# Patient Record
Sex: FEMALE | Race: WHITE | NOT HISPANIC OR LATINO
[De-identification: names, ages, dates, MRNs, and addresses within clinical notes are randomized per-mention and may not be internally consistent; named-entity substitution may affect disease eponyms.]

---

## 2017-09-10 ENCOUNTER — TRANSCRIPTION ENCOUNTER (OUTPATIENT)
Age: 32
End: 2017-09-10

## 2017-10-25 ENCOUNTER — OUTPATIENT (OUTPATIENT)
Dept: OUTPATIENT SERVICES | Facility: HOSPITAL | Age: 32
LOS: 1 days | Discharge: HOME | End: 2017-10-25

## 2017-10-25 DIAGNOSIS — M54.9 DORSALGIA, UNSPECIFIED: ICD-10-CM

## 2018-01-31 ENCOUNTER — OUTPATIENT (OUTPATIENT)
Dept: OUTPATIENT SERVICES | Facility: HOSPITAL | Age: 33
LOS: 1 days | Discharge: HOME | End: 2018-01-31

## 2018-01-31 DIAGNOSIS — M54.16 RADICULOPATHY, LUMBAR REGION: ICD-10-CM

## 2018-01-31 DIAGNOSIS — Z01.818 ENCOUNTER FOR OTHER PREPROCEDURAL EXAMINATION: ICD-10-CM

## 2018-02-07 ENCOUNTER — RESULT REVIEW (OUTPATIENT)
Age: 33
End: 2018-02-07

## 2018-02-07 ENCOUNTER — OUTPATIENT (OUTPATIENT)
Dept: OUTPATIENT SERVICES | Facility: HOSPITAL | Age: 33
LOS: 1 days | Discharge: HOME | End: 2018-02-07

## 2018-02-07 VITALS — RESPIRATION RATE: 18 BRPM | HEART RATE: 70 BPM | SYSTOLIC BLOOD PRESSURE: 133 MMHG | DIASTOLIC BLOOD PRESSURE: 86 MMHG

## 2018-02-07 VITALS
HEART RATE: 94 BPM | RESPIRATION RATE: 20 BRPM | SYSTOLIC BLOOD PRESSURE: 142 MMHG | HEIGHT: 69 IN | WEIGHT: 190.04 LBS | DIASTOLIC BLOOD PRESSURE: 91 MMHG | TEMPERATURE: 98 F

## 2018-02-07 LAB — ABO RH CONFIRMATION: SIGNIFICANT CHANGE UP

## 2018-02-07 RX ORDER — SODIUM CHLORIDE 9 MG/ML
1000 INJECTION, SOLUTION INTRAVENOUS
Qty: 0 | Refills: 0 | Status: DISCONTINUED | OUTPATIENT
Start: 2018-02-07 | End: 2018-02-08

## 2018-02-07 RX ORDER — HYDROMORPHONE HYDROCHLORIDE 2 MG/ML
0.5 INJECTION INTRAMUSCULAR; INTRAVENOUS; SUBCUTANEOUS
Qty: 0 | Refills: 0 | Status: DISCONTINUED | OUTPATIENT
Start: 2018-02-07 | End: 2018-02-08

## 2018-02-07 RX ORDER — ONDANSETRON 8 MG/1
4 TABLET, FILM COATED ORAL ONCE
Qty: 0 | Refills: 0 | Status: DISCONTINUED | OUTPATIENT
Start: 2018-02-07 | End: 2018-02-08

## 2018-02-07 RX ORDER — OXYCODONE AND ACETAMINOPHEN 5; 325 MG/1; MG/1
1 TABLET ORAL ONCE
Qty: 0 | Refills: 0 | Status: DISCONTINUED | OUTPATIENT
Start: 2018-02-07 | End: 2018-02-08

## 2018-02-07 RX ADMIN — SODIUM CHLORIDE 100 MILLILITER(S): 9 INJECTION, SOLUTION INTRAVENOUS at 16:15

## 2018-02-07 RX ADMIN — HYDROMORPHONE HYDROCHLORIDE 0.5 MILLIGRAM(S): 2 INJECTION INTRAMUSCULAR; INTRAVENOUS; SUBCUTANEOUS at 14:51

## 2018-02-07 RX ADMIN — HYDROMORPHONE HYDROCHLORIDE 0.5 MILLIGRAM(S): 2 INJECTION INTRAMUSCULAR; INTRAVENOUS; SUBCUTANEOUS at 14:27

## 2018-02-07 RX ADMIN — HYDROMORPHONE HYDROCHLORIDE 0.5 MILLIGRAM(S): 2 INJECTION INTRAMUSCULAR; INTRAVENOUS; SUBCUTANEOUS at 15:09

## 2018-02-07 RX ADMIN — HYDROMORPHONE HYDROCHLORIDE 0.5 MILLIGRAM(S): 2 INJECTION INTRAMUSCULAR; INTRAVENOUS; SUBCUTANEOUS at 14:43

## 2018-02-07 RX ADMIN — SODIUM CHLORIDE 100 MILLILITER(S): 9 INJECTION, SOLUTION INTRAVENOUS at 14:05

## 2018-02-07 NOTE — CHART NOTE - NSCHARTNOTEFT_GEN_A_CORE
02-07-18 @ 10:35  ARNOLD VELA  [id number]  32yFemale    I have discussed the risks and benefits of hemilaminectomy/discectomy with the patient/patient's proxy including but not limited to bleeding, infection, weakness, numbness, paralysis, CSF leak requiring repair, no change or increase in pain or other symptoms, change in bowel/bladder/sexual function, need for decompression, revision, repeat or other procedure(s).  I have also discussed the possibility of the following:    Failure of fusion (pseudoarthrosis) or over-fusion requiring revision  Use of allografts/autografts/biologics/hardware  Dysphonia, dysphagia, great vessel or esophageal damage, or anterior hematoma requiring decompression  Removal or replacement of device(s)  Lack of coverage compared to trial or abdominal pain/paresthesias  Adjacent segment progression requiring treatment(s)  Extended hospital/rehab/skilled nursing facility stay  Need for flat bedrest  Use of bracing/collar use for an extended period of time    I have also discussed the alternatives to the procedure as well as options for no treatment and/or expected courses for all.  I also discussed the role of any MD/PA first assistant and the patient/patient's proxy assents to their participation.  All questions were answered and the patient/patient's proxy wishes to proceed.

## 2018-02-07 NOTE — ASU DISCHARGE PLAN (ADULT/PEDIATRIC). - MEDICATION SUMMARY - MEDICATIONS TO TAKE
I will START or STAY ON the medications listed below when I get home from the hospital:    oxyCODONE-acetaminophen 5 mg-325 mg oral tablet  -- 1 tab(s) by mouth 4 times a day, As Needed -for moderate pain MDD:4 tabs   -- Caution federal law prohibits the transfer of this drug to any person other  than the person for whom it was prescribed.  May cause drowsiness.  Alcohol may intensify this effect.  Use care when operating dangerous machinery.  This prescription cannot be refilled.  This product contains acetaminophen.  Do not use  with any other product containing acetaminophen to prevent possible liver damage.  Using more of this medication than prescribed may cause serious breathing problems.    -- Indication: For M51.16 / M54.16 / 73600    gabapentin 300 mg oral capsule  -- 1 tab(s) by mouth 2 times a day  -- Indication: For M51.16 / M54.16 / 32667    tiZANidine 4 mg oral tablet  -- 1 tab(s) by mouth every 8 hours, As Needed muscle spasms  -- Indication: For M51.16 / M54.16 / 65801    pantoprazole 20 mg oral delayed release tablet  -- 1 tab(s) by mouth once a day  -- Indication: For M51.16 / M54.16 / 04495

## 2018-02-07 NOTE — BRIEF OPERATIVE NOTE - PROCEDURE
<<-----Click on this checkbox to enter Procedure Hemilaminectomy with discectomy  02/07/2018  Left L5/S1  Active  [id number]

## 2018-02-09 LAB — SURGICAL PATHOLOGY STUDY: SIGNIFICANT CHANGE UP

## 2018-02-12 DIAGNOSIS — M51.17 INTERVERTEBRAL DISC DISORDERS WITH RADICULOPATHY, LUMBOSACRAL REGION: ICD-10-CM

## 2018-10-03 ENCOUNTER — TRANSCRIPTION ENCOUNTER (OUTPATIENT)
Age: 33
End: 2018-10-03

## 2019-01-18 ENCOUNTER — TRANSCRIPTION ENCOUNTER (OUTPATIENT)
Age: 34
End: 2019-01-18

## 2019-04-09 ENCOUNTER — EMERGENCY (EMERGENCY)
Facility: HOSPITAL | Age: 34
LOS: 0 days | Discharge: HOME | End: 2019-04-09
Attending: EMERGENCY MEDICINE | Admitting: EMERGENCY MEDICINE
Payer: COMMERCIAL

## 2019-04-09 VITALS
WEIGHT: 164.91 LBS | TEMPERATURE: 99 F | OXYGEN SATURATION: 97 % | HEIGHT: 68 IN | HEART RATE: 93 BPM | SYSTOLIC BLOOD PRESSURE: 110 MMHG | DIASTOLIC BLOOD PRESSURE: 73 MMHG | RESPIRATION RATE: 16 BRPM

## 2019-04-09 VITALS
HEART RATE: 71 BPM | DIASTOLIC BLOOD PRESSURE: 72 MMHG | RESPIRATION RATE: 16 BRPM | SYSTOLIC BLOOD PRESSURE: 110 MMHG | OXYGEN SATURATION: 99 %

## 2019-04-09 DIAGNOSIS — F17.200 NICOTINE DEPENDENCE, UNSPECIFIED, UNCOMPLICATED: ICD-10-CM

## 2019-04-09 DIAGNOSIS — Z79.2 LONG TERM (CURRENT) USE OF ANTIBIOTICS: ICD-10-CM

## 2019-04-09 DIAGNOSIS — R00.2 PALPITATIONS: ICD-10-CM

## 2019-04-09 DIAGNOSIS — Z87.39 PERSONAL HISTORY OF OTHER DISEASES OF THE MUSCULOSKELETAL SYSTEM AND CONNECTIVE TISSUE: ICD-10-CM

## 2019-04-09 DIAGNOSIS — M51.26 OTHER INTERVERTEBRAL DISC DISPLACEMENT, LUMBAR REGION: Chronic | ICD-10-CM

## 2019-04-09 DIAGNOSIS — Z79.899 OTHER LONG TERM (CURRENT) DRUG THERAPY: ICD-10-CM

## 2019-04-09 LAB
ALBUMIN SERPL ELPH-MCNC: 4.4 G/DL — SIGNIFICANT CHANGE UP (ref 3.5–5.2)
ALP SERPL-CCNC: 47 U/L — SIGNIFICANT CHANGE UP (ref 30–115)
ALT FLD-CCNC: 16 U/L — SIGNIFICANT CHANGE UP (ref 0–41)
ANION GAP SERPL CALC-SCNC: 12 MMOL/L — SIGNIFICANT CHANGE UP (ref 7–14)
AST SERPL-CCNC: 19 U/L — SIGNIFICANT CHANGE UP (ref 0–41)
BILIRUB SERPL-MCNC: 0.2 MG/DL — SIGNIFICANT CHANGE UP (ref 0.2–1.2)
BUN SERPL-MCNC: 9 MG/DL — LOW (ref 10–20)
CALCIUM SERPL-MCNC: 9.2 MG/DL — SIGNIFICANT CHANGE UP (ref 8.5–10.1)
CHLORIDE SERPL-SCNC: 104 MMOL/L — SIGNIFICANT CHANGE UP (ref 98–110)
CK SERPL-CCNC: 62 U/L — SIGNIFICANT CHANGE UP (ref 0–225)
CO2 SERPL-SCNC: 23 MMOL/L — SIGNIFICANT CHANGE UP (ref 17–32)
CREAT SERPL-MCNC: 0.7 MG/DL — SIGNIFICANT CHANGE UP (ref 0.7–1.5)
D DIMER BLD IA.RAPID-MCNC: 91 NG/ML DDU — SIGNIFICANT CHANGE UP (ref 0–230)
GLUCOSE SERPL-MCNC: 90 MG/DL — SIGNIFICANT CHANGE UP (ref 70–99)
HCG SERPL QL: NEGATIVE — SIGNIFICANT CHANGE UP
HCT VFR BLD CALC: 41.8 % — SIGNIFICANT CHANGE UP (ref 37–47)
HGB BLD-MCNC: 14.2 G/DL — SIGNIFICANT CHANGE UP (ref 12–16)
MAGNESIUM SERPL-MCNC: 2.1 MG/DL — SIGNIFICANT CHANGE UP (ref 1.8–2.4)
MCHC RBC-ENTMCNC: 30.7 PG — SIGNIFICANT CHANGE UP (ref 27–31)
MCHC RBC-ENTMCNC: 34 G/DL — SIGNIFICANT CHANGE UP (ref 32–37)
MCV RBC AUTO: 90.3 FL — SIGNIFICANT CHANGE UP (ref 81–99)
NRBC # BLD: 0 /100 WBCS — SIGNIFICANT CHANGE UP (ref 0–0)
PLATELET # BLD AUTO: 423 K/UL — HIGH (ref 130–400)
POTASSIUM SERPL-MCNC: 4.7 MMOL/L — SIGNIFICANT CHANGE UP (ref 3.5–5)
POTASSIUM SERPL-SCNC: 4.7 MMOL/L — SIGNIFICANT CHANGE UP (ref 3.5–5)
PROT SERPL-MCNC: 7 G/DL — SIGNIFICANT CHANGE UP (ref 6–8)
RBC # BLD: 4.63 M/UL — SIGNIFICANT CHANGE UP (ref 4.2–5.4)
RBC # FLD: 12.1 % — SIGNIFICANT CHANGE UP (ref 11.5–14.5)
SODIUM SERPL-SCNC: 139 MMOL/L — SIGNIFICANT CHANGE UP (ref 135–146)
TROPONIN T SERPL-MCNC: <0.01 NG/ML — SIGNIFICANT CHANGE UP
WBC # BLD: 11.29 K/UL — HIGH (ref 4.8–10.8)
WBC # FLD AUTO: 11.29 K/UL — HIGH (ref 4.8–10.8)

## 2019-04-09 PROCEDURE — 71046 X-RAY EXAM CHEST 2 VIEWS: CPT | Mod: 26

## 2019-04-09 PROCEDURE — 99285 EMERGENCY DEPT VISIT HI MDM: CPT

## 2019-04-09 RX ORDER — TIZANIDINE 4 MG/1
1 TABLET ORAL
Qty: 0 | Refills: 0 | COMMUNITY

## 2019-04-09 RX ORDER — GABAPENTIN 400 MG/1
1 CAPSULE ORAL
Qty: 0 | Refills: 0 | COMMUNITY

## 2019-04-09 RX ORDER — SODIUM CHLORIDE 9 MG/ML
1000 INJECTION INTRAMUSCULAR; INTRAVENOUS; SUBCUTANEOUS ONCE
Qty: 0 | Refills: 0 | Status: COMPLETED | OUTPATIENT
Start: 2019-04-09 | End: 2019-04-09

## 2019-04-09 RX ORDER — PANTOPRAZOLE SODIUM 20 MG/1
1 TABLET, DELAYED RELEASE ORAL
Qty: 0 | Refills: 0 | COMMUNITY

## 2019-04-09 RX ADMIN — SODIUM CHLORIDE 2000 MILLILITER(S): 9 INJECTION INTRAMUSCULAR; INTRAVENOUS; SUBCUTANEOUS at 16:35

## 2019-04-09 NOTE — ED PROVIDER NOTE - PROGRESS NOTE DETAILS
34yo F presents wit palpitations. Pt states that she is a smoker and recently had the flu. Pt went to an [facility] on Saturday and had a neg CXR but was told she may have PNA secondary to her wheezing. Pt was started on Doxy. Pt has been having palpitations for the past week before starting the doxy on Saturday. Pt denies any CP, SOB, fever, nausea, vomiting, urinary symptoms, diarrhea. CON: ao x 3, HENMT: clear oropharynx, soft neck, HEAD: atraumatic, CV: rrr, equal pulses b/l, RESP: cta b/l, GI:  soft, nontender, no rebound, no guarding, SKIN: no rash, MSK: no deformities, NEURO: no gross motor or sensory deficit 32yo F presents wit palpitations. Pt states that she is a smoker and recently had the flu. Pt went to an [facility] on Saturday and had a neg CXR but was told she may have PNA secondary to her wheezing. Pt was started on Doxy. Pt has been having palpitations for the past week before starting the doxy on Saturday. Pt denies any CP, SOB, fever, nausea, vomiting, urinary symptoms, diarrhea. CON: ao x 3, HENMT: clear oropharynx, soft neck, HEAD: atraumatic, CV: rrr, equal pulses b/l, RESP: cta b/l, GI:  soft, nontender, no rebound, no guarding, SKIN: no rash, MSK: no swelling NEURO: no gross motor or sensory deficit Private car

## 2019-04-09 NOTE — ED PROVIDER NOTE - NS ED ROS FT
Review of Systems    Constitutional: (-) fever/ chills  Eyes/ENT: (-) blurry vision, (-) sore throat (-) ear pain  Cardiovascular: (-) chest pain, (-) syncope (+) palpitations  Respiratory: (-) cough, (-) shortness of breath  Gastrointestinal: (-) vomiting, (-) diarrhea (-) abdominal pain  Musculoskeletal: (-) neck pain, (-) back pain,   Integumentary: (-) rash, (-) swelling  Neurological: (-) headache, (-) altered mental status  Psychiatric: (-) hallucinations or depression   Allergic/Immunologic: (-) pruritus

## 2019-04-09 NOTE — ED PROVIDER NOTE - CLINICAL SUMMARY MEDICAL DECISION MAKING FREE TEXT BOX
palpatations, screening labs reviewed with pt. d dimer and trop negative. ekg reviewed dc home with outpt f/u such as pmd and or cards advised

## 2019-04-09 NOTE — ED PROVIDER NOTE - OBJECTIVE STATEMENT
32 y/o female presents with palpitations increasing x 1 week. patient dx with flu one week ago and then possible pna. patient placed on doxycycline. patient without any fevers. patient has good appetite and has been keeping hydrated. patient denies any chest pain, dizziness, lightheadedness. patient wihtout abdominal pain, back pain, hematuria. patient without any skipping beats. patient states she has elevated heart rate with mild exertion.

## 2019-04-09 NOTE — ED PROVIDER NOTE - PHYSICAL EXAMINATION
Vital Signs: I have reviewed the initial vital signs.  Constitutional: well-nourished, no acute distress  Eyes: PERRLA, EOMI,  ENT: MMM,   Cardiovascular: regular rate, regular rhythm, no murmur appreciated, no extrasystole  Respiratory: unlabored respiratory effort, clear to auscultation bilaterally  Gastrointestinal: soft, non-tender, non-distended  abdomen, no pulsatile mass  Musculoskeletal: supple neck, no lower extremity edema, no calf tenderness  Integumentary: warm, dry, no rash  Neurologic: awake, alert, cranial nerves II-XII grossly intact, extremities’ motor and sensory functions grossly intact, no focal deficits, GCS 15  Psychiatric: appropriate mood, appropriate affect

## 2019-04-09 NOTE — ED PROVIDER NOTE - CARE PROVIDER_API CALL
Geronimo Burns)  Cardiology; Interventional Cardiology  [address], [address]  [place] [address]  Phone: [phone]  Fax: [phone]  Follow Up Time:

## 2019-08-22 ENCOUNTER — TRANSCRIPTION ENCOUNTER (OUTPATIENT)
Age: 34
End: 2019-08-22

## 2019-10-15 ENCOUNTER — EMERGENCY (EMERGENCY)
Facility: HOSPITAL | Age: 34
LOS: 0 days | Discharge: HOME | End: 2019-10-15
Admitting: EMERGENCY MEDICINE
Payer: COMMERCIAL

## 2019-10-15 VITALS
HEART RATE: 100 BPM | OXYGEN SATURATION: 99 % | SYSTOLIC BLOOD PRESSURE: 135 MMHG | RESPIRATION RATE: 18 BRPM | TEMPERATURE: 97 F | DIASTOLIC BLOOD PRESSURE: 90 MMHG

## 2019-10-15 DIAGNOSIS — F17.200 NICOTINE DEPENDENCE, UNSPECIFIED, UNCOMPLICATED: ICD-10-CM

## 2019-10-15 DIAGNOSIS — K08.89 OTHER SPECIFIED DISORDERS OF TEETH AND SUPPORTING STRUCTURES: ICD-10-CM

## 2019-10-15 DIAGNOSIS — M51.26 OTHER INTERVERTEBRAL DISC DISPLACEMENT, LUMBAR REGION: Chronic | ICD-10-CM

## 2019-10-15 PROBLEM — Z78.9 OTHER SPECIFIED HEALTH STATUS: Chronic | Status: ACTIVE | Noted: 2019-04-09

## 2019-10-15 PROCEDURE — 99282 EMERGENCY DEPT VISIT SF MDM: CPT

## 2019-10-15 NOTE — CONSULT NOTE ADULT - SUBJECTIVE AND OBJECTIVE BOX
Patient is a 34y old  Female who presents with a chief complaint of lower right swelling around last tooth.    HPI: Patient reports swelling has been present for a week.      PAST MEDICAL & SURGICAL HISTORY:  No pertinent past medical history  Lumbar herniated disc    (   ) heart valve replacement  (   ) joint replacement  (   ) pregnancy    MEDICATIONS  (STANDING):    MEDICATIONS  (PRN):      REVIEW OF SYSTEMS      General:	    Skin/Breast:  	  Ophthalmologic:  	  ENMT:	    Respiratory and Thorax:  	  Cardiovascular:	    Gastrointestinal:	    Genitourinary:	    Musculoskeletal:	    Neurological:	    Psychiatric:	    Hematology/Lymphatics:	    Endocrine:	    Allergic/Immunologic:	    Allergies    No Known Allergies    Intolerances        FAMILY HISTORY:      *SOCIAL HISTORY: (   ) Tobacco; (   ) ETOH    Vital Signs Last 24 Hrs  T(C): 35.9 (15 Oct 2019 11:54), Max: 35.9 (15 Oct 2019 11:54)  T(F): 96.6 (15 Oct 2019 11:54), Max: 96.6 (15 Oct 2019 11:54)  HR: 100 (15 Oct 2019 11:54) (100 - 100)  BP: 135/90 (15 Oct 2019 11:54) (135/90 - 135/90)  BP(mean): --  RR: 18 (15 Oct 2019 11:54) (18 - 18)  SpO2: 99% (15 Oct 2019 11:54) (99% - 99%)    LABS:                  Last Dental Visit: <<Unknown  >>    EOE:  TMJ (-   ) clicks                     (-   ) pops                     ( -  ) crepitus             Mandible <<FROM>>             Facial bones and MOM <<grossly intact>>             ( -  ) trismus             ( -  ) lymphadenopathy             ( -  ) swelling             ( -  ) asymmetry             ( -  ) palpation             (  - ) dyspnea             (  - ) dysphagia             ( -  ) loss of consciousness    IOE:  <<permanent>> dentition:            <<multiple missing teeth>>             Dentition Present <<  >>                     Mobility <<  >>                     Caries <<  >>                hard/soft palate:  (   ) palatal torus, <<No pathology noted>>            tongue/FOM <<No pathology noted>>            labial/buccal mucosa <<No pathology noted>>           ( -  ) percussion           ( +  ) palpation           (  + ) swelling            (  - ) abscess           (  - ) sinus tract    *DENTAL RADIOGRAPHS: 1 panograph taken and interpreted.    RADIOLOGY & ADDITIONAL STUDIES: N/A    *ASSESSMENT: Clinical and radiographic exams reveal tooth #30 with periapical radiolucency and partially impacted #32. Patient is not sensitive to percussion but is sensitive to palpation tooth #32 area - Pericoronotis with exudate is observed.     *PLAN: Prescribe antibiotic and refer to specialist for extraction #32.    PROCEDURE:   Verbal and written consent given.  Anesthesia: <<None    >>   Treatment: <<Emergency Exam and 1 panograph taken and interpreted. Pericoronitis noted tooth #32. Patient informed of all clinical and radiographic observations. Explained all treatment options including no treatment, patient elects to seek referral for specialist to extract #32.   >>     RECOMMENDATIONS:  1) <<Prescribed 500mg Amoxicillin q8h x 7days; 600mg Ibuprofen q6h x 3 days as needed for pain; Referred to an in network Oral Surgeon for extraction #32.   >>  2) Dental F/U with outpatient dentist for comprehensive dental care.   3) If any difficulty swallowing/breathing, fever occur, return to ER.     Stevo Rehman DDS, pager #[phone]

## 2019-10-15 NOTE — ED PROVIDER NOTE - ENMT, MLM
Airway patent, Nasal mucosa clear. Mouth with normal mucosa. Throat has no vesicles, no oropharyngeal exudates and uvula is midline.  + tenderness along tooth #32 with surrounding gum fluctuance

## 2019-10-15 NOTE — ED PROVIDER NOTE - OBJECTIVE STATEMENT
35 y/o F, no significant PMHx, presents to the ED with complaints of dentalgia. She admits to pain along her right lower tooth; denies facial swelling, fever, chills, odynophagia and dysphagia. She states that she has not recently been cared for by a dentist.

## 2019-10-15 NOTE — ED ADULT NURSE NOTE - NS ED NURSE NOTE DISPO AOU DETAILS FT
pt provided with dc papers by PA and transferred to dental clinic, no s/s of distress. ambulating with steady gait.

## 2021-07-08 NOTE — ED ADULT TRIAGE NOTE - BP NONINVASIVE SYSTOLIC (MM HG)
Additional Notes: Patient consent was obtained to proceed with the visit and recommended plan of care after discussion of all risks and benefits, including the risks of COVID-19 exposure. Detail Level: Simple 110

## 2021-09-07 ENCOUNTER — TRANSCRIPTION ENCOUNTER (OUTPATIENT)
Age: 36
End: 2021-09-07

## 2022-01-24 NOTE — ED ADULT NURSE NOTE - NSFALLRSKINDICATORS_ED_ALL_ED
Patient Education    BRIGHT FUTURES HANDOUT- PARENT  3 YEAR VISIT  Here are some suggestions from [employer] experts that may be of value to your family.     HOW YOUR FAMILY IS DOING  Take time for yourself and to be with your partner.  Stay connected to friends, their personal interests, and work.  Have regular playtimes and mealtimes together as a family.  Give your child hugs. Show your child how much you love him.  Show your child how to handle anger well--time alone, respectful talk, or being active. Stop hitting, biting, and fighting right away.  Give your child the chance to make choices.  Don t smoke or use e-cigarettes. Keep your home and car smoke-free. Tobacco-free spaces keep children healthy.  Don t use alcohol or drugs.  If you are worried about your living or food situation, talk with us. Community agencies and programs such as WIC and SNAP can also provide information and assistance.    EATING HEALTHY AND BEING ACTIVE  Give your child 16 to 24 oz of milk every day.  Limit juice. It is not necessary. If you choose to serve juice, give no more than 4 oz a day of 100% juice and always serve it with a meal.  Let your child have cool water when she is thirsty.  Offer a variety of healthy foods and snacks, especially vegetables, fruits, and lean protein.  Let your child decide how much to eat.  Be sure your child is active at home and in  or .  Apart from sleeping, children should not be inactive for longer than 1 hour at a time.  Be active together as a family.  Limit TV, tablet, or smartphone use to no more than 1 hour of high-quality programs each day.  Be aware of what your child is watching.  Don t put a TV, computer, tablet, or smartphone in your child s bedroom.  Consider making a family media plan. It helps you make rules for media use and balance screen time with other activities, including exercise.    PLAYING WITH OTHERS  Give your child a variety of toys for dressing  up, make-believe, and imitation.  Make sure your child has the chance to play with other preschoolers often. Playing with children who are the same age helps get your child ready for school.  Help your child learn to take turns while playing games with other children.    READING AND TALKING WITH YOUR CHILD  Read books, sing songs, and play rhyming games with your child each day.  Use books as a way to talk together. Reading together and talking about a book s story and pictures helps your child learn how to read.  Look for ways to practice reading everywhere you go, such as stop signs, or labels and signs in the store.  Ask your child questions about the story or pictures in books. Ask him to tell a part of the story.  Ask your child specific questions about his day, friends, and activities.    SAFETY  Continue to use a car safety seat that is installed correctly in the back seat. The safest seat is one with a 5-point harness, not a booster seat.  Prevent choking. Cut food into small pieces.  Supervise all outdoor play, especially near streets and driveways.  Never leave your child alone in the car, house, or yard.  Keep your child within arm s reach when she is near or in water. She should always wear a life jacket when on a boat.  Teach your child to ask if it is OK to pet a dog or another animal before touching it.  If it is necessary to keep a gun in your home, store it unloaded and locked with the ammunition locked separately.  Ask if there are guns in homes where your child plays. If so, make sure they are stored safely.    WHAT TO EXPECT AT YOUR CHILD S 3 YEAR VISIT  We will talk about  Caring for your child, your family, and yourself  Getting ready for school  Eating healthy  Promoting physical activity and limiting TV time  Keeping your child safe at home, outside, and in the car      Helpful Resources: Smoking Quit Line: 751.465.4081  Family Media Use Plan: www.healthychildren.org/MediaUsePlan  Poison  Help Line:  979.430.6085  Information About Car Safety Seats: www.safercar.gov/parents  Toll-free Auto Safety Hotline: 582.217.7720  Consistent with Bright Futures: Guidelines for Health Supervision of Infants, Children, and Adolescents, 4th Edition  For more information, go to https://brightfutures.aap.org.            no

## 2022-05-30 ENCOUNTER — EMERGENCY (EMERGENCY)
Facility: HOSPITAL | Age: 37
LOS: 0 days | Discharge: HOME | End: 2022-05-30
Attending: EMERGENCY MEDICINE | Admitting: EMERGENCY MEDICINE
Payer: COMMERCIAL

## 2022-05-30 VITALS
HEART RATE: 102 BPM | HEIGHT: 68 IN | TEMPERATURE: 99 F | DIASTOLIC BLOOD PRESSURE: 59 MMHG | SYSTOLIC BLOOD PRESSURE: 133 MMHG | WEIGHT: 179.9 LBS | OXYGEN SATURATION: 98 % | RESPIRATION RATE: 20 BRPM

## 2022-05-30 DIAGNOSIS — M51.26 OTHER INTERVERTEBRAL DISC DISPLACEMENT, LUMBAR REGION: ICD-10-CM

## 2022-05-30 DIAGNOSIS — M54.12 RADICULOPATHY, CERVICAL REGION: ICD-10-CM

## 2022-05-30 DIAGNOSIS — M51.26 OTHER INTERVERTEBRAL DISC DISPLACEMENT, LUMBAR REGION: Chronic | ICD-10-CM

## 2022-05-30 PROCEDURE — 99283 EMERGENCY DEPT VISIT LOW MDM: CPT

## 2022-05-30 RX ORDER — DEXAMETHASONE 0.5 MG/5ML
8 ELIXIR ORAL ONCE
Refills: 0 | Status: COMPLETED | OUTPATIENT
Start: 2022-05-30 | End: 2022-05-30

## 2022-05-30 RX ORDER — METHOCARBAMOL 500 MG/1
1000 TABLET, FILM COATED ORAL ONCE
Refills: 0 | Status: COMPLETED | OUTPATIENT
Start: 2022-05-30 | End: 2022-05-30

## 2022-05-30 RX ORDER — CYCLOBENZAPRINE HYDROCHLORIDE 10 MG/1
1 TABLET, FILM COATED ORAL
Qty: 15 | Refills: 0
Start: 2022-05-30 | End: 2022-06-03

## 2022-05-30 RX ADMIN — METHOCARBAMOL 1000 MILLIGRAM(S): 500 TABLET, FILM COATED ORAL at 14:29

## 2022-05-30 RX ADMIN — Medication 8 MILLIGRAM(S): at 14:41

## 2022-05-30 NOTE — ED PROVIDER NOTE - NS ED ATTENDING STATEMENT MOD
This was a shared visit with the [facility]. I reviewed and verified the documentation and independently performed the documented:

## 2022-05-30 NOTE — ED ADULT TRIAGE NOTE - CCCP TRG CHIEF CMPLNT
Patient stood at bedside in attempt to urinate  Unable to void    
Pt requesting pedilyte, writer spoke with MD, MD states that it is ok for him to have something to drink but is concerned about the amt of salt in pedilyte. Pt requesting gatorade instead, cafeteria called.   
neck pain

## 2022-05-30 NOTE — ED PROVIDER NOTE - CARE PROVIDER_API CALL
Ritika Mcclellan)  Neurosurgery  [address], [address]  [place] [address]  Phone: [phone]  Fax: [phone]  Follow Up Time:

## 2022-05-30 NOTE — ED PROVIDER NOTE - NSFOLLOWUPINSTRUCTIONS_ED_ALL_ED_FT
Cervical Radiculopathy       Cervical radiculopathy happens when a nerve in the neck (a cervical nerve) is pinched or bruised. This condition can happen because of an injury to the cervical spine (vertebrae) in the neck, or as part of the normal aging process. Pressure on the cervical nerves can cause pain or numbness that travels from the neck all the way down into the arm and fingers. Usually, this condition gets better with rest. Treatment may be needed if the condition does not improve.      What are the causes?    This condition may be caused by:  •A neck injury.      •A bulging (herniated) disk.      •Muscle spasms.      •Muscle tightness in the neck because of overuse.      •Arthritis.      •Breakdown or degeneration in the bones and joints of the spine (spondylosis) due to aging.      •Bone spurs that may develop near the cervical nerves.        What are the signs or symptoms?    Symptoms of this condition include:  •Pain. The pain may travel from the neck to the arm and hand. The pain can be severe or irritating. It may be worse when you move your neck.      •Numbness or tingling in your arm or hand.      •Weakness in the affected arm and hand, in severe cases.        How is this diagnosed?    This condition may be diagnosed based on your symptoms, your medical history, and a physical exam. You may also have tests, including:  •X-rays.      •A CT scan.      •An MRI.      •An electromyogram (EMG).      •Nerve conduction tests.        How is this treated?    In many cases, treatment is not needed for this condition. With rest, the condition usually gets better over time. If treatment is needed, options may include:  •Wearing a soft neck collar (cervical collar) for short periods of time, as told by your health care provider.      •Doing physical therapy to strengthen your neck muscles.      •Taking medicines, such as NSAIDs or oral corticosteroids.      •Having spinal injections, in severe cases.      •Having surgery. This may be needed if other treatments do not help. Different types of surgery may be done depending on the cause of this condition.        Follow these instructions at home:    If you have a cervical collar:     •Wear it as told by your health care provider. Remove it only as told by your health care provider.    •Ask your health care provider if you can remove the collar for cleaning and bathing. If you are allowed to remove the collar for cleaning or bathing:  •Follow instructions from your health care provider about how to remove the collar safely.      •Clean the collar by wiping it with mild soap and water and drying it completely.      •Take out any removable pads in the collar every 1–2 days, and wash them by hand with soap and water. Let them air-dry completely before you put them back in the collar.      •Check your skin under the collar for irritation or sores. If you see any, tell your health care provider.          Managing pain                   •Take over-the-counter and prescription medicines only as told by your health care provider.    •If directed, put ice on the affected area.  •If you have a soft neck collar, remove it as told by your health care provider.      •Put ice in a plastic bag.      •Place a towel between your skin and the bag.      •Leave the ice on for 20 minutes, 2–3 times a day.      •If applying ice does not help, you can try using heat. Use the heat source that your health care provider recommends, such as a moist heat pack or a heating pad.  •Place a towel between your skin and the heat source.      •Leave the heat on for 20–30 minutes.      •Remove the heat if your skin turns bright red. This is especially important if you are unable to feel pain, heat, or cold. You may have a greater risk of getting burned.        •Try a gentle neck and shoulder massage to help relieve symptoms.      Activity     •Rest as needed.      •Return to your normal activities as told by your health care provider. Ask your health care provider what activities are safe for you.      •Do stretching and strengthening exercises as told by your health care provider or physical therapist.      • Do not lift anything that is heavier than 10 lb (4.5 kg) until your health care provider tells you that it is safe.      General instructions     •Use a flat pillow when you sleep.      • Do not drive while wearing a cervical collar. If you do not have a cervical collar, ask your health care provider if it is safe to drive while your neck heals.      •Ask your health care provider if the medicine prescribed to you requires you to avoid driving or using heavy machinery.      • Do not use any products that contain nicotine or tobacco, such as cigarettes, e-cigarettes, and chewing tobacco. These can delay healing. If you need help quitting, ask your health care provider.      •Keep all follow-up visits as told by your health care provider. This is important.        Contact a health care provider if:    •Your condition does not improve with treatment.        Get help right away if:    •Your pain gets much worse and cannot be controlled with medicines.      •You have weakness or numbness in your hand, arm, face, or leg.      •You have a high fever.      •You have a stiff, rigid neck.      •You lose control of your bowels or your bladder (have incontinence).      •You have trouble with walking, balance, or speaking.        Summary    •Cervical radiculopathy happens when a nerve in the neck is pinched or bruised.      •A nerve can get pinched from a bulging disk, arthritis, muscle spasms, or an injury to the neck.      •Symptoms include pain, tingling, or numbness radiating from the neck into the arm or hand. Weakness can also occur in severe cases.      •Treatment may include rest, wearing a cervical collar, and physical therapy. Medicines may be prescribed to help with pain. In severe cases, injections or surgery may be needed.      This information is not intended to replace advice given to you by your health care provider. Make sure you discuss any questions you have with your health care provider.

## 2022-05-30 NOTE — ED PROVIDER NOTE - PHYSICAL EXAMINATION
--EXAM--  VITAL SIGNS: I have reviewed vs documented at present.  CONSTITUTIONAL: Well-developed; well-nourished; in no acute distress.   .  NECK:  no midline cervical tenderness there is para spinal muscle spasm left   CARD: S1, S2, Regular rate and rhythm.   RESP: No wheezes, rales or rhonchi.

## 2022-05-30 NOTE — ED PROVIDER NOTE - CARE PROVIDERS DIRECT ADDRESSES
[email or web address]

## 2022-05-30 NOTE — ED PROVIDER NOTE - TOBACCO USE
Date of Service: 12/05/2018    PREOPERATIVE DIAGNOSIS:  Left foot wound.    POSTOPERATIVE DIAGNOSIS:  Left foot wound.    OPERATION:  Left foot wound excisional debridement down to bone and tendon using electrocautery knife and curet.    SURGEON:  Luis Alberto Clifford MD.     ASSISTANT:  Zita Mae PA-C    ANESTHESIA:  MAC and local.    INDICATIONS:  The patient is a 60-year-old female who had a previous right below-knee amputation and had a left foot wound and has refused amputation.  She underwent revascularization in an attempt to heal these wounds.  She had imaging with evidence of pus under the eschars.  I had a discussion with her about risks, benefits and alternatives of debridement.  She agreed to proceed.    DESCRIPTION OF PROCEDURE:  The patient was brought to the operating room.  She was placed in supine position.  Her left leg was elevated.  Anesthetic was induced.  The left foot was prepped and draped in the usual sterile fashion.  She received antibiotics.  Formal timeout was performed.  I used a knife to remove the eschar.  There was pus immediately underneath it.  This tracked a little superiorly up the leg for a short distance but then extensively to the medial aspect of the foot as well as down to the sole of the foot.  I opened the track as much as necessary, by then having about of 5 cm round x 2 cm deep wound.  I made a counter incision on the medial aspect on the dorsum of the foot and placed a 1/2-inch Penrose and secured it with a 2-0 nylon stitch.      The wound was irrigated.  Local was injected.  Moist Kerlix was applied.  I used a curet to clamp the wound base first.  It was down to bone and tendon.  This seemed like a non-salvageable situation for the foot, but rather than persisting I wanted to have discussion with her before proceeding to amputate her leg or deform her foot any further.      At this point, the wound was irrigated, local was injected.  Moist gauze was applied and the  foot was wrapped.  She was moved back to the hospital bed and taken to recovery in stable and satisfactory condition.        Dictated By: Luis Alberto Clifford MD  Signing Provider: MD FRANCESCA Chavira/maricel ([id number])  DD: 12/05/2018 08:28:25 TD: 12/05/2018 08:42:30    Copy Sent To:      Never smoker

## 2022-05-30 NOTE — ED PROVIDER NOTE - PATIENT PORTAL LINK FT
You can access the FollowMyHealth Patient Portal offered by [facility] by registering at the following website: [email or web address]. By joining [facility] FollowMyHealth portal, you will also be able to view your health information using other applications (apps) compatible with our system.

## 2022-05-30 NOTE — ED PROVIDER NOTE - NS ED ROS FT
Review of Systems:  	•	CONSTITUTIONAL - no fever, no diaphoresis, no chills    	•	RESPIRATORY - no shortness of breath, no cough  	•	CARDIAC - no chest pain, no palpitations                                  CERVICAL SPINE- no midline tenderness there is para spinal muscle spasm noted to left side  	•	MUSCULOSKELETAL - no joint paint, no swelling, no redness  	•	NEUROLOGIC - no weakness, no headache, no paresthesias, no LOC Review of Systems:  	•	CONSTITUTIONAL - no fever, no diaphoresis, no chills    	•	RESPIRATORY - no shortness of breath, no cough  	•	CARDIAC - no chest pain, no palpitations                                  CERVICAL SPINE- neck pain  	•	MUSCULOSKELETAL - no joint paint, no swelling, no redness  	•	NEUROLOGIC - no weakness, no headache, no paresthesias, no LOC

## 2022-05-30 NOTE — ED PROVIDER NOTE - OBJECTIVE STATEMENT
this is a 37 yo female presents to ed for evaluation of neck pain radiating down her left arm for about 5 days. patient does not remember any injury to area or doing anything that caused pain. patient states she was sore for a day or two then it got worse.

## 2022-05-30 NOTE — ED PROVIDER NOTE - ATTENDING APP SHARED VISIT CONTRIBUTION OF CARE
There is paracervical muscular spasm.  There is no bony midline tenderness.  There is some painful range of motion of the neck.  Motor function of the median, radial, and ulnar nerves are intact.  There is a mild sensory deficit of the left radial nerve distribution.  DTRs are equal.  Steroids and muscle relaxants ordered for symptom reduction.  In my opinion, outpatient follow-up and treatment is medically appropriate.

## 2022-05-31 PROBLEM — Z00.00 ENCOUNTER FOR PREVENTIVE HEALTH EXAMINATION: Status: ACTIVE | Noted: 2022-05-31

## 2022-06-10 ENCOUNTER — NON-APPOINTMENT (OUTPATIENT)
Age: 37
End: 2022-06-10

## 2022-06-15 ENCOUNTER — APPOINTMENT (OUTPATIENT)
Dept: NEUROSURGERY | Facility: CLINIC | Age: 37
End: 2022-06-15
Payer: COMMERCIAL

## 2022-06-15 VITALS — HEIGHT: 68 IN | WEIGHT: 175 LBS | BODY MASS INDEX: 26.52 KG/M2

## 2022-06-15 DIAGNOSIS — Z80.9 FAMILY HISTORY OF MALIGNANT NEOPLASM, UNSPECIFIED: ICD-10-CM

## 2022-06-15 DIAGNOSIS — Z82.49 FAMILY HISTORY OF ISCHEMIC HEART DISEASE AND OTHER DISEASES OF THE CIRCULATORY SYSTEM: ICD-10-CM

## 2022-06-15 DIAGNOSIS — Z72.0 TOBACCO USE: ICD-10-CM

## 2022-06-15 DIAGNOSIS — Z83.3 FAMILY HISTORY OF DIABETES MELLITUS: ICD-10-CM

## 2022-06-15 DIAGNOSIS — F17.200 NICOTINE DEPENDENCE, UNSPECIFIED, UNCOMPLICATED: ICD-10-CM

## 2022-06-15 DIAGNOSIS — Z82.61 FAMILY HISTORY OF ARTHRITIS: ICD-10-CM

## 2022-06-15 PROCEDURE — 99203 OFFICE O/P NEW LOW 30 MIN: CPT

## 2022-06-15 NOTE — PLAN
[FreeTextEntry1] : I discussed the MRI and clinical findings with Ms. Mascorro in detail.  I believe an MRI cervical spine and flexion extension xray is warranted.  she will return once the imaging is done.

## 2022-06-15 NOTE — HISTORY OF PRESENT ILLNESS
[FreeTextEntry1] : neck pain [de-identified] : Ms. King is a 36 year-old female who presents with neck pain.  She had a lumbar laminectomy in 2018.  The pain is centered in the neck and radiates down the left arm.  She is dropping things with her left arm and is clumsy with her hands.  She had an exacerbation 3 weeks ago and went to the ER.  Medrol dose pack helped.\par \par She has had extensive PT without traction and epidural steroid injections.

## 2022-06-24 ENCOUNTER — OUTPATIENT (OUTPATIENT)
Dept: OUTPATIENT SERVICES | Facility: HOSPITAL | Age: 37
LOS: 1 days | Discharge: HOME | End: 2022-06-24

## 2022-06-24 ENCOUNTER — RESULT REVIEW (OUTPATIENT)
Age: 37
End: 2022-06-24

## 2022-06-24 DIAGNOSIS — M47.22 OTHER SPONDYLOSIS WITH RADICULOPATHY, CERVICAL REGION: ICD-10-CM

## 2022-06-24 DIAGNOSIS — M51.26 OTHER INTERVERTEBRAL DISC DISPLACEMENT, LUMBAR REGION: Chronic | ICD-10-CM

## 2022-06-24 PROCEDURE — 72141 MRI NECK SPINE W/O DYE: CPT | Mod: 26

## 2022-06-24 PROCEDURE — 72052 X-RAY EXAM NECK SPINE 6/>VWS: CPT | Mod: 26

## 2022-06-27 ENCOUNTER — APPOINTMENT (OUTPATIENT)
Dept: NEUROSURGERY | Facility: CLINIC | Age: 37
End: 2022-06-27
Payer: COMMERCIAL

## 2022-06-27 VITALS — HEIGHT: 68 IN | BODY MASS INDEX: 26.52 KG/M2 | WEIGHT: 175 LBS

## 2022-06-27 PROCEDURE — 99213 OFFICE O/P EST LOW 20 MIN: CPT

## 2022-06-27 NOTE — HISTORY OF PRESENT ILLNESS
[de-identified] : Ms. VELA developed neck pain with left upper extremity radiculopathy.  At this time she was dropping things and felt weakness in her left arm and hand.  Due to an exacerbation she presents to the ER recently.  She was prescribed a Medrol dose pack which is helped her.  In the past she has had PT and epidural steroid injections.  No recent treatments. No bowel / bladder dysfunction.\par \par Today we reviewed an MRI and x-rays of the cervical spine from 6/24/2022, [facility].  She is found to have C5-6 and C6-7 spondylosis with neural compression.  No cord compression.

## 2022-07-21 ENCOUNTER — APPOINTMENT (OUTPATIENT)
Dept: PAIN MANAGEMENT | Facility: CLINIC | Age: 37
End: 2022-07-21

## 2022-07-21 VITALS
HEART RATE: 93 BPM | DIASTOLIC BLOOD PRESSURE: 93 MMHG | BODY MASS INDEX: 26.52 KG/M2 | WEIGHT: 175 LBS | HEIGHT: 68 IN | SYSTOLIC BLOOD PRESSURE: 123 MMHG

## 2022-07-21 DIAGNOSIS — Z56.0 UNEMPLOYMENT, UNSPECIFIED: ICD-10-CM

## 2022-07-21 PROCEDURE — 99204 OFFICE O/P NEW MOD 45 MIN: CPT

## 2022-07-21 SDOH — ECONOMIC STABILITY - INCOME SECURITY: UNEMPLOYMENT, UNSPECIFIED: Z56.0

## 2022-07-21 NOTE — PHYSICAL EXAM
[de-identified] : Constitutional\par GENERAL APPEARANCE OF PATIENT IS WELL DEVELOPED, WELL NOURISHED, BODY HABITUS NORMAL, WELL GROOMED, NO DEFORMITIES NOTED. \par \par Head\par -          Atraumatic and Normocephalic \par \par Eyes, Nose, and Throat:\par -          External inspection of ears and nose are normal overall without scars, lesions, or masses noted\par -          Assessment of hearing is normal\par \par Neck\par -          Examination of neck shows no masses, overall appearance is normal, neck is symmetric, tracheal position is midline, no crepitus is noted\par -          Examination of thyroid shows no enlargement, tenderness or masses\par \par Respiratory\par -          Assessment of respiratory effort shows no intercostal retractions, no use of accessory muscles, unlabored breathing, and normal diaphragmatic movement.\par \par Cardiovascular\par -          Examination of extremities show no edema or varicosities\par \par Musculoskeletal\par -           Inspection and palpation of digits and nails shows no clubbing, cyanosis, nodules, drainage, fluctuance, petechiae\par \par 1)         Spine- inspection and palpation shows no misalignment, asymmetry, crepitation, defects, tenderness, masses, effusions. ROM is normal without crepitation or contracture. No instability or subluxation or laxity is noted. No abnormal movements.\par \par 2)         Neck- tenderness into the cervical paraspinals. ROM restricted. Pain with flexion. Positive spurling on the left. Numbness/tingling in the left arm.\par \par 3)         RUE- inspection and palpation shows no misalignment, asymmetry, crepitation, defects, tenderness, masses, effusions. ROM is normal without crepitation or contracture. No instability or subluxation or laxity is noted. No abnormal movements.\par \par 4)         LUE-inspection and palpation shows no misalignment, asymmetry, crepitation, defects, tenderness, masses, effusions. ROM is normal without crepitation or contracture. No instability or subluxation or laxity is noted. No abnormal movements.\par \par 5)         RLE- inspection and palpation shows no misalignment, asymmetry, crepitation, defects, tenderness, masses, effusions. ROM is normal without crepitation or contracture. No instability or subluxation or laxity is noted. No abnormal movements.\par \par 6)         LLE-inspection and palpation shows no misalignment, asymmetry, crepitation, defects, tenderness, masses, effusions. ROM is normal without crepitation or contracture. No instability or subluxation or laxity is noted. No abnormal movements.\par \par Skin\par -           Inspection of skin and subcutaneous tissue shows no rashes, lesions or ulcers\par -           Palpation of skin and subcutaneous tissue shows no rashes, no indurations, subcutaneous nodules or tightening.\par \par  Abdomen\par -           Soft; Non-tender\par \par Neurologic\par -           CN 2-12 are grossly intact\par -           No sensory or motor deficits in the upper and lower extremities\par -           Adequate strength in upper and lower extremities\par \par Psychiatric\par -           Patients judgment and insight are intact\par -           Oriented to time, place and person\par -           Recent and remote memory intact.\par

## 2022-07-21 NOTE — DATA REVIEWED
[FreeTextEntry1] : MRI of the cervical spine taken on 06/24/2022 showed no cervical cord compression or signal abnormality.  C6 on C7 mild retrolisthesis with circumferential disc osteophyte complex, and superimposed bilateral foraminal shallow disc protrusions contributing to moderate spinal stenosis and moderate left worse than right foraminal stenosis.  There is mild flattening of the right ventral cord without cord edema.\par \par SOAPP: Scored a 4 , moderate risk.\par  \par [place] REGISTRY: Reviewed .\par  \par UDS: No data obtained today. \par  \par Medications that trigger a UDS: Benzodiazepines (Ativan, Xanax, Valium) etc, Barbiturates, Narcotics (Avinza, Butrans, hydrocodone, Codeine, Mercedes, Methadone, Morphine, MS Contin, Opana, oxycodone, Oxycontin, Suboxone etc), Pregabalin (Lyrica), Tramadol (Ultacet, Utram etc), Tapentadol, (Nucynta) and Elist Drugs (cocaine, THC, Etc.)\par  \par Risk factors: Bipolar Illness, positive for any an illicit drugs, history of any ETOH and drug abuse, any signs of diversion, Sharing Meds, selling meds. Non consistent [place] drug reporting and above 120meq of morphine\par  \par Low risk: Patient has combination of a low risk SOAP and no risk factors. UDS would be repeated randomly every quarter\par

## 2022-07-21 NOTE — HISTORY OF PRESENT ILLNESS
[FreeTextEntry1] : HISTORY OF PRESENT ILLNESS: Ms. Mascorro is a 36 year old female complaining of neck pain. The pain started after no inciting event.  The patient has had this pain for 18 years, with pain worsening over the past 2 months.  Patient describes pain as moderate to severe. She currently rates the pain at a 9/10 on the pain scale. She states the pain emanates in the cervical spine with radiation into the left upper extremity with associated numbness and tingling. She recently was evaluated by neurosurgery who did recommend surgery however she is hesitant.  She has done extensive physical therapy with little to no relief.\par \par During the last month the pain has been intermittent with symptoms worsening no typical pattern. Pain described as burning, cramping, dull/aching.  Pain is increased with sitting, walking, exercising, coughing/sneezing.  Pain is decreased with lying down, relax.  Pain is not changed with standing, bowel movements.  Bowel or bladder habits have not changed.\par  \par ACTIVITIES: Patient could walk 5 blocks before the pain starts.  Patient can sit []before pain starts.  Patient can stand 30 minutes before pain starts.  The patient sometimes lays down because of pain.  Patient uses no assistive walking device at this time.  Patient has difficulty performing household chores, doing outside work or shopping, participating in recreational activities at this time.\par \par PRIOR PAIN TREATMENTS:  Moderate relief with injection, physical therapy, 10s unit, heat treatment and cold treatment.\par \par Prior Pain Medications:  Motrin, Percocet, gabapentin.\par

## 2022-07-21 NOTE — ASSESSMENT
[FreeTextEntry1] : 36 year old female presenting with cervical radicular pain. Patient is presenting with radicular pain with impairment in ADLs and functionality.  The pain has not responded to conservative care, including medications, stretching, as well as active modalities, such as physical therapy.  Imaging studies as well as physical exam findings corroborate the symptomatology and radicular pain.  We will proceed with an epidural at this point. We will proceed with a cervical epidural steroid injection with sedation. Follow up in 6 weeks will be made for reassessment. I have explained the findings to the patient and all questions have been answered.\par \par Patient had a MRI that shows a radicular component along with pain referred into the upper extremity. Patient has trialed rehab (Home exercise, physical therapy or chiropractic care) and medications. I will schedule a C7-T1 cervical epidural steroid injection.\par \par Risk, benefits, pros and cons of procedure were explained to the patient using models and diagrams and their questions were answered. \par \par \par The patient has severe anxiety of procedures that necessitates monitored anesthesia care (MAC). The procedure performed will be close to major nerves, arteries, and spinal cord and/or joint structures. Due to the proximity of these structures, we need the patient to be still during the procedure.  With the help of MAC, this will be safely achieved and decrease the risk of any complications.\par \par No medications were given at todays visit.\par \par We encourage a home exercise program, stressing walking and strengthening of the core. We have recommended exercises such as the modified plank, Sumeet exercise, elliptical , recumbent bike, as well as shoulder griddle strengthening. We discussed recreational activities such as swimming, Mika Chi and yoga. Use things that heat like hot shower or icy heat before rehab and exercising and at the beginning of the day, and ice (ice in a bag never directly on the skin) after activity and at the end of the day.\par \par A total of 46 minutes was spent on this visit, reviewing previous notes/consultations/imaging, counseling the patient on appropriate biomechanics impacting the pain, ordering tests if needed, refilling meds if needed, and documenting the findings in the note.\par \par Entered by Martha Vergara, acting as scribe for Dr. Calderon.\par  \par The documentation recorded by the scribe, in my presence, accurately reflects the service I personally performed, and the decisions made by me with my edits as appropriate.\par  \par Best Regards, \par Dennis Calderon MD \par Board Certified, Anesthesiology \par Board Certified, Pain Medicine\par \par \par \par

## 2022-08-04 NOTE — ASSESSMENT
Chronic, 4 pound weight loss since last visit.  No major dietary changes.   [FreeTextEntry1] : We have had a thorough discussion regarding her current condition, findings, and treatment options.  The severity of Ms. VELA's pain and radiculopathy has lessened compared to last visit.  She would like to start physical therapy and see pain management for her residual symptoms.  I will see her back in 6 to 8 weeks.  In the future she is a candidate for a C5-C6 7 total disc replacement if need be. All questions answered today.\par

## 2022-08-08 ENCOUNTER — NON-APPOINTMENT (OUTPATIENT)
Age: 37
End: 2022-08-08

## 2022-08-10 ENCOUNTER — APPOINTMENT (OUTPATIENT)
Dept: PAIN MANAGEMENT | Facility: CLINIC | Age: 37
End: 2022-08-10

## 2022-08-10 PROCEDURE — 93770 DETERMINATION VENOUS PRESS: CPT

## 2022-08-10 PROCEDURE — 72040 X-RAY EXAM NECK SPINE 2-3 VW: CPT

## 2022-08-10 PROCEDURE — 00600 ANES PX CRV SPINE&CORD NOS: CPT | Mod: QZ,P2

## 2022-08-10 PROCEDURE — 94761 N-INVAS EAR/PLS OXIMETRY MLT: CPT

## 2022-08-10 PROCEDURE — 93040 RHYTHM ECG WITH REPORT: CPT | Mod: 59

## 2022-08-10 PROCEDURE — 62321 NJX INTERLAMINAR CRV/THRC: CPT

## 2022-08-10 NOTE — PROCEDURE
[FreeTextEntry1] : CERVICAL EPIDURAL STEROID INJECTION\par  [FreeTextEntry3] : CERVICAL EPIDURAL STEROID INJECTION\par \par \par \par Date:  08/10/2022\par \par Patient: Naomi Mascorro\par \par \par \par Preoperative Diagnosis: Cervical radiculopathy\par Postoperative Diagnosis: Cervical radiculopathy\par \par \par Procedure:\par 1. Interlaminar C7-T1 Cervical Epidural Injection under fluoroscopy\par 2. Epidurography\par 3. Fluoroscopic guidance and localization of needle\par \par \par Physician: Dennis Calderon M.D. \par Anesthesiologist/CRNA: Ms. Davis\par Anesthesia: MAC. See nurses note\par \par \par Medical Necessity:  Failure of conservative management.\par Indication for Fluoroscopy:  This procedure requires the precise placement of the spinal needle into the epidural space.  It is the only way to accurately and safely perform the injection.\par Consent:  Though unusual, the possible complications including infection, bleeding, nerve damage, hospital admission, stroke, pneumothorax, death or failure of the procedure are theoretically possible. The patient was educated about the of the procedure and alternative therapies. All questions were answered and the patient freely gave consent to proceed.\par The patient was also told that sometimes patients will notice upper and/or lower extremity weakness immediately following the procedure due to extravasation of local anesthetic solution onto the main nerve root during the procedure. In addition, the patient was informed of other possible complications such as phrenic nerve injury, weak/heavy head, or muscle injury.  Lastly, the patient was informed that 1 to 2 weeks of perioperative discomfort following the procedure is to be expected.\par \par \par Monitoring:  Patient had continuous blood pressure, EKG, and pulse oximetry throughout the case. See nurse's notes.\par \par \par \par PROCEDURE NOTE: After obtaining written consent, the patient was positioned prone on the operating table. The back to her neck and upper thorax was prepped with Betadine and draped in usual sterile fashion.  A time out was performed.  The C7-T1 interspace was identified using fluoroscopy. The skin was infiltrated with lidocaine 2% -- 1 cc for subcutaneous analgesia.  The epidural space was identified using a 18g touhy needle with a midline approach using a loss of resistance technique. 2cc omnipaque was used to define the space. A solution of 5 ml of preservative-free sterile saline and 1ml of dexamethasone 10mg, 1cc was infused with minimal pressure on the syringe into the epidural space.  The needle tract and tubing were cleared with 2ml of preservative free normal saline. The procedure was tolerated well. There was no evidence of CSF, Paresthesias nor heme. \par \par \par Epidurogram: Distal and proximal spread was noted.\par Findings: Cervical Spine AP and oblique views with x-ray degenerative changes noted.\par \par Complications: none. \par \par Disposition: I have examined the patient and there are no new physical findings since original presentation. The patient was discharged home with a . The discharge instruction sheet was given to the patient. Motor function was intact.\par \par \par \par Comment: 1st ROMEO today, depending on effectiveness would schedule 2nd ROMEO in 1-2 weeks vs caudal epidural steroid vs follow up in office. Call if any problems\par \par \par \par This document was signed by:\par \par Dennis Calderon MD \par Board Certified, Anesthesiology \par Board Certified, Pain Medicine

## 2022-08-17 ENCOUNTER — APPOINTMENT (OUTPATIENT)
Dept: PAIN MANAGEMENT | Facility: CLINIC | Age: 37
End: 2022-08-17

## 2022-08-18 ENCOUNTER — APPOINTMENT (OUTPATIENT)
Dept: PAIN MANAGEMENT | Facility: CLINIC | Age: 37
End: 2022-08-18

## 2022-08-18 VITALS
WEIGHT: 175 LBS | SYSTOLIC BLOOD PRESSURE: 132 MMHG | HEART RATE: 102 BPM | DIASTOLIC BLOOD PRESSURE: 96 MMHG | HEIGHT: 68 IN | BODY MASS INDEX: 26.52 KG/M2

## 2022-08-18 PROCEDURE — 99214 OFFICE O/P EST MOD 30 MIN: CPT

## 2022-08-18 NOTE — PHYSICAL EXAM
[de-identified] : Musculoskeletal\par -           Inspection and palpation of digits and nails shows no clubbing, cyanosis, nodules, drainage, fluctuance, petechiae\par \par 1)         Spine- inspection and palpation shows no misalignment, asymmetry, crepitation, defects, tenderness, masses, effusions. ROM is normal without crepitation or contracture. No instability or subluxation or laxity is noted. No abnormal movements.\par \par 2)         Neck- tenderness into the cervical paraspinals, paracervical spasms Bilaterally.  trigger points appreciated. ROM restricted. Pain with flexion. Numbness/tingling in the left arm.\par \par 3)         RUE- inspection and palpation shows no misalignment, asymmetry, crepitation, defects, tenderness, masses, effusions. ROM is normal without crepitation or contracture. No instability or subluxation or laxity is noted. No abnormal movements.\par \par 4)         LUE-inspection and palpation shows no misalignment, asymmetry, crepitation, defects, tenderness, masses, effusions. ROM is normal without crepitation or contracture. No instability or subluxation or laxity is noted. No abnormal movements.\par \par 5)         RLE- inspection and palpation shows no misalignment, asymmetry, crepitation, defects, tenderness, masses, effusions. ROM is normal without crepitation or contracture. No instability or subluxation or laxity is noted. No abnormal movements.\par \par 6)         LLE-inspection and palpation shows no misalignment, asymmetry, crepitation, defects, tenderness, masses, effusions. ROM is normal without crepitation or contracture. No instability or subluxation or laxity is noted. No abnormal movements.

## 2022-08-18 NOTE — ASSESSMENT
[FreeTextEntry1] : 36 year old female presenting with cervical radicular pain.  she recently underwent a cervical epidural injection with no relief of her pain.  Pain continues to be on going and severe with associated spasms.  At this time, we will move forward with cervical trigger point injections x3 with ultrasound guidance.  We will have her stop Flexeril and start tizanidine 4 mg 1-2 tabs to be taken daily as needed.\par

## 2022-08-18 NOTE — HISTORY OF PRESENT ILLNESS
[FreeTextEntry1] : HISTORY OF PRESENT ILLNESS: Ms. Mascorro is a 36 year old female complaining of neck pain. The pain started after no inciting event.  The patient has had this pain for 18 years, with pain worsening over the past 2 months.  Patient describes pain as moderate to severe. She currently rates the pain at a 9/10 on the pain scale. She states the pain emanates in the cervical spine with radiation into the left upper extremity with associated numbness and tingling. She recently was evaluated by neurosurgery who did recommend surgery however she is hesitant.  She has done extensive physical therapy with little to no relief.\par \par During the last month the pain has been intermittent with symptoms worsening no typical pattern. Pain described as burning, cramping, dull/aching.  Pain is increased with sitting, walking, exercising, coughing/sneezing.  Pain is decreased with lying down, relax.  Pain is not changed with standing, bowel movements.  Bowel or bladder habits have not changed.\par \par PRIOR PAIN TREATMENTS:  Moderate relief with injection, physical therapy, 10s unit, heat treatment and cold treatment.\par \par \par \par TODAY: She presents today for revisit encounter regarding her ongoing cervical radicular pain.  She did undergo cervical epidural injection on 08/10/2022.  With the injection, she noted minimal relief with her pain.  instead, she noted more pain and headaches after the injection.    Her pain is now at baseline.  She reports of constant spasms in her neck which can be quite bothersome.  She is on currently on Flexeril which she noted no relief with.  She states she was previously on tizanidine many years ago which did provide her with better relief.

## 2022-08-18 NOTE — DISCUSSION/SUMMARY
[de-identified] : Patient has documented myofascial spasms and trigger points in the muscle. Patient has trialed rehab (Home exercise, physical therapy or chiropractic care) and medications. We will schedule a series of 1-3 cervical trigger points over 3-6 weeks. If 50% or greater relief for 6-8 weeks another can be repeated vs Botox. \par \par \par Risk, benefits, pros and cons of procedure were explained to the patient using models and diagrams and their questions were answered. \par \par Followup 1-2 weeks post injection for assessment of efficacy and further recommendations.\par \par \par Continue with activity and home exercise program as tolerated.\par \par \par I personally reviewed with the PA, this patient's history and physical exam findings, as documented above. I have discussed the relevant areas of concern, having direct implications to the presenting problems and illnesses, and I have personally examined all pertinent and positive and negative findings, which impact on the prior pain management treatment.\par \par Sammi Zamora PA-C\par Dennis Calderon MD\par \par \par

## 2022-08-18 NOTE — DATA REVIEWED
[FreeTextEntry1] : MRI of the cervical spine taken on 06/24/2022 showed no cervical cord compression or signal abnormality.  C6 on C7 mild retrolisthesis with circumferential disc osteophyte complex, and superimposed bilateral foraminal shallow disc protrusions contributing to moderate spinal stenosis and moderate left worse than right foraminal stenosis.  There is mild flattening of the right ventral cord without cord edema.\par \par SOAPP: Scored a 4 , moderate risk.\par  \par [place] REGISTRY: Reviewed .\par  \par UDS: No data obtained today. \par  \par \par Low risk: Patient has combination of a low risk SOAP and no risk factors. UDS would be repeated randomly every quarter\par

## 2022-08-24 ENCOUNTER — APPOINTMENT (OUTPATIENT)
Dept: PAIN MANAGEMENT | Facility: CLINIC | Age: 37
End: 2022-08-24

## 2022-09-19 ENCOUNTER — APPOINTMENT (OUTPATIENT)
Dept: NEUROSURGERY | Facility: CLINIC | Age: 37
End: 2022-09-19

## 2022-09-19 VITALS — HEIGHT: 68 IN | BODY MASS INDEX: 27.28 KG/M2 | WEIGHT: 180 LBS

## 2022-09-19 PROCEDURE — 99214 OFFICE O/P EST MOD 30 MIN: CPT

## 2022-09-19 RX ORDER — NAPROXEN 500 MG/1
500 TABLET ORAL
Qty: 14 | Refills: 0 | Status: DISCONTINUED | COMMUNITY
Start: 2022-06-11 | End: 2022-09-19

## 2022-09-19 RX ORDER — OXYCODONE AND ACETAMINOPHEN 5; 325 MG/1; MG/1
5-325 TABLET ORAL
Qty: 12 | Refills: 0 | Status: DISCONTINUED | COMMUNITY
Start: 2022-05-30 | End: 2022-09-19

## 2022-09-19 RX ORDER — METHYLPREDNISOLONE 4 MG/1
4 TABLET ORAL
Qty: 1 | Refills: 0 | Status: DISCONTINUED | COMMUNITY
Start: 2022-06-15 | End: 2022-09-19

## 2022-09-19 RX ORDER — PREDNISONE 50 MG/1
50 TABLET ORAL
Qty: 5 | Refills: 0 | Status: DISCONTINUED | COMMUNITY
Start: 2022-06-11 | End: 2022-09-19

## 2022-09-19 RX ORDER — TIZANIDINE 4 MG/1
4 TABLET ORAL TWICE DAILY
Qty: 60 | Refills: 0 | Status: DISCONTINUED | COMMUNITY
Start: 2022-08-18 | End: 2022-09-19

## 2022-09-19 RX ORDER — TIZANIDINE 4 MG/1
4 TABLET ORAL TWICE DAILY
Qty: 60 | Refills: 1 | Status: DISCONTINUED | COMMUNITY
Start: 2022-08-18 | End: 2022-09-19

## 2022-09-19 RX ORDER — IBUPROFEN 600 MG/1
600 TABLET, FILM COATED ORAL
Qty: 28 | Refills: 0 | Status: ACTIVE | COMMUNITY
Start: 2022-09-07

## 2022-09-19 RX ORDER — AMOXICILLIN 500 MG/1
500 CAPSULE ORAL
Qty: 21 | Refills: 0 | Status: ACTIVE | COMMUNITY
Start: 2022-09-07

## 2022-09-19 RX ORDER — CYCLOBENZAPRINE HYDROCHLORIDE 10 MG/1
10 TABLET, FILM COATED ORAL
Qty: 21 | Refills: 0 | Status: DISCONTINUED | COMMUNITY
Start: 2022-06-11 | End: 2022-09-19

## 2022-09-19 RX ORDER — CYCLOBENZAPRINE HYDROCHLORIDE 10 MG/1
10 TABLET, FILM COATED ORAL
Qty: 30 | Refills: 3 | Status: DISCONTINUED | COMMUNITY
Start: 2022-06-27 | End: 2022-09-19

## 2022-09-28 ENCOUNTER — APPOINTMENT (OUTPATIENT)
Dept: PAIN MANAGEMENT | Facility: CLINIC | Age: 37
End: 2022-09-28

## 2022-11-08 ENCOUNTER — APPOINTMENT (OUTPATIENT)
Dept: PAIN MANAGEMENT | Facility: CLINIC | Age: 37
End: 2022-11-08

## 2022-12-01 ENCOUNTER — NON-APPOINTMENT (OUTPATIENT)
Age: 37
End: 2022-12-01

## 2022-12-12 ENCOUNTER — APPOINTMENT (OUTPATIENT)
Dept: PAIN MANAGEMENT | Facility: CLINIC | Age: 37
End: 2022-12-12

## 2022-12-12 PROCEDURE — 20553 NJX 1/MLT TRIGGER POINTS 3/>: CPT

## 2022-12-12 PROCEDURE — 94761 N-INVAS EAR/PLS OXIMETRY MLT: CPT

## 2022-12-12 PROCEDURE — 76942 ECHO GUIDE FOR BIOPSY: CPT

## 2022-12-12 NOTE — PROCEDURE
[FreeTextEntry1] : Cervical Trigger Point Injection under Ultrasound Guidance   [FreeTextEntry3] : Date:  2022\par \par Patient: Naomi Mascorro\par \par :  1985\par \par \par \par  \par \par Preoperative diagnosis: Trigger points/ Myalgia / Myofascial Pain Syndrome  \par \par Postoperative diagnosis: Same  \par    \par Procedure:   Cervical Trigger point injections with ultrasound guidance.\par                       Trigger point injections, three muscles \par                       Ultrasound Guidance \par \par   \par \par Physician:  Dennis Calderon MD   \par Anesthesia:   Local\par Indications for Ultrasound: Accurate percutaneous needle placement requires image guidance to prevent neuro/vascular injury or intravascular injection, as well as to avoid lung injury.\par Medical Necessity:  The patient presents with a distribution of pain consistent with trigger point pathology.  On palpation and with ultrasound examination there is focal tenderness and a palpable taut band of muscle with restriction of motion.  Noninvasive treatment modalities such as rest, heat/ice and stretching have been ineffective.  Failure of conservative management is verified.\par Consent:  Though unusual, the possible complications including infection, bleeding, nerve damage, hospital admission, stroke, pneumothorax, death or failure of the procedure are theoretically possible. The patient was educated about the of the procedure and alternative therapies. All questions were answered and the patient freely gave consent to proceed.\par Monitoring:  Patient had continuous blood pressure, EKG, and pulse oximetry throughout the case. See nurse's notes.\par \par \par \par PROCEDURE NOTE: Areas of point tenderness in the muscles were identified and prepped with hibilcens in a sterile fashion.  A time out was performed.  Using continuous ultrasound guidance for needle localization, multiple trigger points in three muscle groups, cervical multifidi, cervical spinalis and cervical semispinalis were injected with a 25-gauge 0.5" needle using 5ML 0.50% BUPIVACAINE with 5ML of 2% LIDOCAINE local anesthetic. The direction of needle insertion was kept as parallel to the skin as possible for injections over the thoracic region to avoid directing the needle tip towards the lung.   \par \par \par \par Ultrasound findings: No calcifications\par \par Disposition: I have examined the patient and there are no new physical findings since the original presentation.  Sensory and motor function were intact. Lung sounds were clear.\par \par Comment: 1st trigger point today, depending on effectiveness , would repeat in 1 week or follow up in office in 1 week. Call if any problems.\par \par \par \par \par Dennis Calderon MD \par Board Certified, Anesthesiology \par Board Certified, Pain Medicine \par \par

## 2022-12-20 ENCOUNTER — APPOINTMENT (OUTPATIENT)
Dept: PAIN MANAGEMENT | Facility: CLINIC | Age: 37
End: 2022-12-20

## 2023-01-04 ENCOUNTER — APPOINTMENT (OUTPATIENT)
Dept: NEUROSURGERY | Facility: CLINIC | Age: 38
End: 2023-01-04
Payer: COMMERCIAL

## 2023-01-04 VITALS — HEIGHT: 68 IN | BODY MASS INDEX: 27.28 KG/M2 | WEIGHT: 180 LBS

## 2023-01-04 PROCEDURE — 99214 OFFICE O/P EST MOD 30 MIN: CPT

## 2023-01-04 NOTE — PHYSICAL EXAM
[FreeTextEntry1] : Constitutional: Well appearing, no distress\par HEENT: Normocephalic Atraumatic\par Psychiatric: Alert and oriented to all spheres, normal mood\par Pulmonary: No respiratory distress\par \par Neurologic: \par CN II-XII grossly intact\par Palpation: + left trapezial spasms. \par Strength: Full strength in all major muscle groups, however weakness 4/5 left biceps. \par Sensation: Full sensation to light touch in all extremities\par Reflexes: \par  2+ biceps\par  2+ triceps\par No Mandujano's\par ROM: mild restriction. \par Gait: steady, walking w/o assistance. \par  \par

## 2023-01-04 NOTE — HISTORY OF PRESENT ILLNESS
[de-identified] : Ms. VELA developed neck pain with left upper extremity radiculopathy and weakness in her left arm a few months ago. She has completed 6 sessions of PT however due to other medical issues she was unable to proceed with consistent PT treatments. She does report some decrease in pain and improvement in her left arm strength. She continues to have residual weakness in the left biceps 4/5. \par \par - MRI and x-rays of the cervical spine, 6/24/2022, [facility]: C5-6 and C6-7 spondylosis with neurocompression.  No cord compression.

## 2023-01-04 NOTE — ASSESSMENT
[FreeTextEntry1] : We have had a thorough discussion regarding her current condition, findings, and treatment options.  The severity of Ms. VELA's pain and radiculopathy has lessened compared to last visit.  She would like to continues with physical therapy and have more consistent visits. I will see her back in 6 to 8 weeks.  In the future she is a candidate for a C5/6 C6/7 total disc replacement if need be. All questions answered today. \par

## 2023-01-05 NOTE — ASSESSMENT
[FreeTextEntry1] : 38 yo F presents for neurosurgical revisit with regards to cervical radiculopathy. She has undergone conservative efforts in the form of physical therapy which failed to provide relief. Pain Management injections (TPI and ROMEO) also failed to provide symptom reduction and therefore neurosurgical intervention once again reviewed. She is aware of the indication for a TDR C5-6, 6-7. The expected benefit and overall outcome reviewed and she is agreeable to proceed at this time.  We will request prompt authorization and we have reviewed possible surgical dates at this time.\par \par Arlet Go PA-C\par Ritika Mcclellan MD

## 2023-01-19 ENCOUNTER — APPOINTMENT (OUTPATIENT)
Dept: PAIN MANAGEMENT | Facility: CLINIC | Age: 38
End: 2023-01-19
Payer: COMMERCIAL

## 2023-01-19 VITALS — HEIGHT: 68 IN | WEIGHT: 180 LBS | BODY MASS INDEX: 27.28 KG/M2

## 2023-01-19 PROCEDURE — 99214 OFFICE O/P EST MOD 30 MIN: CPT

## 2023-01-19 NOTE — ASSESSMENT
[FreeTextEntry1] : 36 year old female presenting with cervical radicular pain. She is possibly seeking surgical correction. She would like to get a second opinion. I will have her consult with Dr. Erazo. She can follow up afterwards. \par \par Entered by Martha Vergara, acting as scribe for Dr. Calderon.\par  \par The documentation recorded by the scribe, in my presence, accurately reflects the service I personally performed, and the decisions made by me with my edits as appropriate.\par  \par Best Regards, \par Dennis Calderon MD \par Board Certified, Anesthesiology \par Board Certified, Pain Medicine\par \par \par

## 2023-01-19 NOTE — HISTORY OF PRESENT ILLNESS
[FreeTextEntry1] : HISTORY OF PRESENT ILLNESS: Ms. Mascorro is a 36 year old female complaining of neck pain. The pain started after no inciting event.  The patient has had this pain for 18 years, with pain worsening over the past 2 months.  Patient describes pain as moderate to severe. She currently rates the pain at a 9/10 on the pain scale. She states the pain emanates in the cervical spine with radiation into the left upper extremity with associated numbness and tingling. She recently was evaluated by neurosurgery who did recommend surgery however she is hesitant.  She has done extensive physical therapy with little to no relief.\par \par During the last month the pain has been intermittent with symptoms worsening no typical pattern. Pain described as burning, cramping, dull/aching.  Pain is increased with sitting, walking, exercising, coughing/sneezing.  Pain is decreased with lying down, relax.  Pain is not changed with standing, bowel movements.  Bowel or bladder habits have not changed.\par \par PRIOR PAIN TREATMENTS:  Moderate relief with injection, physical therapy, 10s unit, heat treatment and cold treatment.\par \par TODAY:  Since last visit, she underwent a cervical trigger point injection on 12/12/2022 with little to no relief.\par \par Since the procedure, she did go to Neurosurgery ( Dr. Mcclellan ), who did recommend surgery. She did schedule the surgery already.\par \par She continues with ongoing severe neck pain. She states the pain is in the neck and radiates into the upper extremities. She notes ongoing numbness, tingling and spasms. She also has some loss of  strength in the hands. She states the pain is worse with the colder weather. She states the pain is currently rated at a 8/10 on the pain scale. She states the pain is constant and daily. \par \par She has trialed multiple medications along with injections with little to no relief.

## 2023-01-19 NOTE — PHYSICAL EXAM
[de-identified] : Musculoskeletal\par -           Inspection and palpation of digits and nails shows no clubbing, cyanosis, nodules, drainage, fluctuance, petechiae\par \par 1)         Spine- inspection and palpation shows no misalignment, asymmetry, crepitation, defects, tenderness, masses, effusions. ROM is normal without crepitation or contracture. No instability or subluxation or laxity is noted. No abnormal movements.\par \par 2)         Neck- tenderness into the cervical paraspinals, paracervical spasms Bilaterally.  trigger points appreciated. ROM restricted. Pain with flexion. Numbness/tingling in the left arm.\par \par 3)         RUE- inspection and palpation shows no misalignment, asymmetry, crepitation, defects, tenderness, masses, effusions. ROM is normal without crepitation or contracture. No instability or subluxation or laxity is noted. No abnormal movements.\par \par 4)         LUE-inspection and palpation shows no misalignment, asymmetry, crepitation, defects, tenderness, masses, effusions. ROM is normal without crepitation or contracture. No instability or subluxation or laxity is noted. No abnormal movements.\par \par 5)         RLE- inspection and palpation shows no misalignment, asymmetry, crepitation, defects, tenderness, masses, effusions. ROM is normal without crepitation or contracture. No instability or subluxation or laxity is noted. No abnormal movements.\par \par 6)         LLE-inspection and palpation shows no misalignment, asymmetry, crepitation, defects, tenderness, masses, effusions. ROM is normal without crepitation or contracture. No instability or subluxation or laxity is noted. No abnormal movements.

## 2023-01-27 ENCOUNTER — APPOINTMENT (OUTPATIENT)
Dept: ORTHOPEDIC SURGERY | Facility: CLINIC | Age: 38
End: 2023-01-27
Payer: COMMERCIAL

## 2023-01-27 PROCEDURE — 99203 OFFICE O/P NEW LOW 30 MIN: CPT

## 2023-01-27 NOTE — DATA REVIEWED
[FreeTextEntry1] : Reviewed the patient's MRI of her cervical spine.  The patient has C5-6 C6-7 left-sided foraminal stenosis.

## 2023-01-27 NOTE — HISTORY OF PRESENT ILLNESS
[de-identified] : 37-year-old female with a significant history of cervical radiculopathy is here for second opinion.  She was seen by the neurosurgeon who recommended a two-level cervical disc replacement for cervical foraminal stenosis and radiculopathy.  The patient states that she has been struggling with this pain for many many years however suddenly a few weeks ago the pain completely resolved and all she has now is neck pain.

## 2023-01-27 NOTE — DISCUSSION/SUMMARY
[de-identified] : 37-year-old female with cervical radiculopathy that has now resolved.  I agree with Dr. Mcclellan's plan for treatment of the cervical radiculopathy symptoms.  I have advised the patient to follow up with Dr. Mcclellan.

## 2023-02-01 ENCOUNTER — APPOINTMENT (OUTPATIENT)
Dept: NEUROSURGERY | Facility: CLINIC | Age: 38
End: 2023-02-01
Payer: COMMERCIAL

## 2023-02-01 VITALS — HEIGHT: 68 IN | WEIGHT: 190 LBS | BODY MASS INDEX: 28.79 KG/M2

## 2023-02-01 PROCEDURE — 99212 OFFICE O/P EST SF 10 MIN: CPT

## 2023-02-02 NOTE — REASON FOR VISIT
[Follow-Up: _____] : a [unfilled] follow-up visit [Spouse] : spouse [FreeTextEntry1] : patient is present today with spouse. Patient reports she has surgery scheduled for the 17th. But will like to discuss if she should still proceed, since all the major pain has gone away. Numbness and tingling also weakness is not present today.

## 2023-02-02 NOTE — ASSESSMENT
[FreeTextEntry1] : Discussed with patient that if her symptoms went away she should hold off on surgery.  She will return in 3 months for re-evaluation.  For now we will cancel.

## 2023-02-17 ENCOUNTER — APPOINTMENT (OUTPATIENT)
Dept: NEUROSURGERY | Facility: HOSPITAL | Age: 38
End: 2023-02-17

## 2023-03-06 RX ORDER — TIZANIDINE 4 MG/1
4 TABLET ORAL
Qty: 30 | Refills: 3 | Status: ACTIVE | COMMUNITY
Start: 2023-03-06 | End: 1900-01-01

## 2023-04-19 ENCOUNTER — APPOINTMENT (OUTPATIENT)
Dept: PAIN MANAGEMENT | Facility: CLINIC | Age: 38
End: 2023-04-19

## 2023-05-24 ENCOUNTER — APPOINTMENT (OUTPATIENT)
Dept: NEUROSURGERY | Facility: CLINIC | Age: 38
End: 2023-05-24
Payer: COMMERCIAL

## 2023-05-24 VITALS — HEIGHT: 68 IN | BODY MASS INDEX: 28.04 KG/M2 | WEIGHT: 185 LBS

## 2023-05-24 DIAGNOSIS — M47.22 OTHER SPONDYLOSIS WITH RADICULOPATHY, CERVICAL REGION: ICD-10-CM

## 2023-05-24 PROCEDURE — 99211 OFF/OP EST MAY X REQ PHY/QHP: CPT

## 2023-05-25 PROBLEM — M47.22 CERVICAL SPONDYLOSIS WITH RADICULOPATHY: Status: ACTIVE | Noted: 2022-06-15

## 2023-05-25 NOTE — HISTORY OF PRESENT ILLNESS
[FreeTextEntry1] : CHIEF COMPLAINT: PT Reports having Neck pain, Pain from Neck travels down both arms. Denies any Numbness but has Tingling and weakness in both arms.   Her symptoms are manageable.  She does not wish to proceed with surgery at this time.\par \par \par \par

## 2023-05-25 NOTE — REVIEW OF SYSTEMS
[As Noted in HPI] : as noted in HPI [Arm Weakness] : arm weakness [Tingling] : tingling [de-identified] : PT has tingling in both arms along with weakness

## 2024-04-02 NOTE — ED ADULT TRIAGE NOTE - INTERNATIONAL TRAVEL
Detail Level: Detailed Depth Of Biopsy: dermis Was A Bandage Applied: Yes Size Of Lesion In Cm: 0.7 X Size Of Lesion In Cm: 0 Biopsy Type: H and E Biopsy Method: Dermablade No Anesthesia Type: 1% lidocaine with epinephrine Anesthesia Volume In Cc: 0.5 No... Hemostasis: Electrocautery Wound Care: Petrolatum Dressing: bandage Destruction After The Procedure: No Type Of Destruction Used: Curettage Curettage Text: The wound bed was treated with curettage after the biopsy was performed. Cryotherapy Text: The wound bed was treated with cryotherapy after the biopsy was performed. Electrodesiccation Text: The wound bed was treated with electrodesiccation after the biopsy was performed. Electrodesiccation And Curettage Text: The wound bed was treated with electrodesiccation and curettage after the biopsy was performed. Silver Nitrate Text: The wound bed was treated with silver nitrate after the biopsy was performed. Lab: [id number] Lab Facility: [id number] Path Notes Override (Will Replace All Of The Above Text): 7mm Consent: Written consent was obtained and risks were reviewed including but not limited to scarring, infection, bleeding, scabbing, incomplete removal, nerve damage and allergy to anesthesia. Post-Care Instructions: I reviewed with the patient in detail post-care instructions. Patient is to keep the biopsy site dry overnight, and then apply bacitracin twice daily until healed. Patient may apply hydrogen peroxide soaks to remove any crusting. Notification Instructions: Patient will be notified of biopsy results. However, patient instructed to call the office if not contacted within 2 weeks. Billing Type: Third-Party Bill Information: Selecting Yes will display possible errors in your note based on the variables you have selected. This validation is only offered as a suggestion for you. PLEASE NOTE THAT THE VALIDATION TEXT WILL BE REMOVED WHEN YOU FINALIZE YOUR NOTE. IF YOU WANT TO FAX A PRELIMINARY NOTE YOU WILL NEED TO TOGGLE THIS TO 'NO' IF YOU DO NOT WANT IT IN YOUR FAXED NOTE.

## 2024-08-11 ENCOUNTER — NON-APPOINTMENT (OUTPATIENT)
Age: 39
End: 2024-08-11

## 2024-11-30 NOTE — ED ADULT TRIAGE NOTE - MODE OF ARRIVAL
Goal Outcome Evaluation: [id number]      Plan of Care Reviewed With: patient    Overall Patient Progress: no changeOverall Patient Progress: no change     Pt alert and intermittently awake overnight. Pt called for IV dilaudid q2-3hrs, stating pain severe. Pt up independently in room to the bathroom. Pt refused CHG wipes. Noted body odor, encouraged personal hygiene. No other issues noted overnight. Continue with POC       Walk in

## 2025-01-20 NOTE — ED ADULT TRIAGE NOTE - HISTORY OF COVID-19 VACCINATION
Caller: Gifty Hoover    Relationship to patient: Self    Best call back number: [phone]     Chief complaint: PATIENT TO RESCHEDULE 1/22/25 AND 1/24/25 APPT    Type of visit: LAB AND FU    Requested date: NEXT AVAILABLE   No